# Patient Record
Sex: FEMALE | Race: WHITE | HISPANIC OR LATINO | Employment: UNEMPLOYED | URBAN - METROPOLITAN AREA
[De-identification: names, ages, dates, MRNs, and addresses within clinical notes are randomized per-mention and may not be internally consistent; named-entity substitution may affect disease eponyms.]

---
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
## 2023-01-13 RX ORDER — NORELGESTROMIN AND ETHINLY ESTRADIOL 150; 35 UG/D; UG/D
PATCH TRANSDERMAL
Qty: 9 PATCH | Refills: 0 | Status: SHIPPED | OUTPATIENT
Start: 2023-01-13 | End: 2023-04-05

## 2023-04-05 RX ORDER — NORELGESTROMIN AND ETHINLY ESTRADIOL 150; 35 UG/D; UG/D
PATCH TRANSDERMAL
Qty: 9 PATCH | Refills: 0 | Status: SHIPPED | OUTPATIENT
Start: 2023-04-05

## 2023-04-25 ENCOUNTER — TELEPHONE (OUTPATIENT)
Dept: SURGERY | Facility: CLINIC | Age: 18
End: 2023-04-25

## 2023-04-25 NOTE — TELEPHONE ENCOUNTER
HUB TO SHARE:    Received faxed request for Omeprazole capsules refill from [facility] in [place] for Dr. Hernandez.    Called patient and lvm informing her that Dr. Hernandez is no longer in practice. If she would like future refills she should give our office a call to schedule an appointment with one of our providers to establish care. Or she can ask her PCP if she might have one.     Declined Rx request and faxed back to [facility].

## 2023-05-12 ENCOUNTER — OFFICE VISIT (OUTPATIENT)
Dept: OBSTETRICS AND GYNECOLOGY | Facility: CLINIC | Age: 18
End: 2023-05-12
Payer: COMMERCIAL

## 2023-05-12 VITALS
SYSTOLIC BLOOD PRESSURE: 102 MMHG | DIASTOLIC BLOOD PRESSURE: 60 MMHG | BODY MASS INDEX: 25.4 KG/M2 | WEIGHT: 129.4 LBS | HEIGHT: 60 IN

## 2023-05-12 DIAGNOSIS — Z30.45 ENCOUNTER FOR SURVEILLANCE OF TRANSDERMAL PATCH HORMONAL CONTRACEPTIVE DEVICE: ICD-10-CM

## 2023-05-12 DIAGNOSIS — Z01.419 ENCOUNTER FOR GYNECOLOGICAL EXAMINATION WITHOUT ABNORMAL FINDING: ICD-10-CM

## 2023-05-12 DIAGNOSIS — Z13.89 SCREENING FOR GENITOURINARY CONDITION: Primary | ICD-10-CM

## 2023-05-12 DIAGNOSIS — Z11.3 SCREENING FOR STD (SEXUALLY TRANSMITTED DISEASE): ICD-10-CM

## 2023-05-12 LAB
BILIRUB BLD-MCNC: NEGATIVE MG/DL
CLARITY, POC: CLEAR
COLOR UR: YELLOW
GLUCOSE UR STRIP-MCNC: NEGATIVE MG/DL
KETONES UR QL: NEGATIVE
LEUKOCYTE EST, POC: NEGATIVE
NITRITE UR-MCNC: NEGATIVE MG/ML
PH UR: 5 [PH] (ref 5–8)
PROT UR STRIP-MCNC: NEGATIVE MG/DL
RBC # UR STRIP: NEGATIVE /UL
SP GR UR: 1 (ref 1–1.03)
UROBILINOGEN UR QL: NORMAL

## 2023-05-12 NOTE — PROGRESS NOTES
" GYN Exam    CC- Here for annual and f/u patches    Yuliya Mejias is a 17 y.o. female est pt here for annaul exam.  She is on the Xulane patch and is doing well.  She is having a light cycle every month and has no side effects. She is at [employer] and studying Khmer and English. . She is SA and using condoms. She never wants children and is wanting a tubal. We discussed a Kyleena IUD and she will consider it.       OB History        0    Para   0    Term   0       0    AB   0    Living   0       SAB   0    IAB   0    Ectopic   0    Molar   0    Multiple   0    Live Births   0          Obstetric Comments   No plans             Menarche: 11  Current contraception: condoms & patch  History of abnormal Pap smear: no  History of abnormal mammogram: no  Family history of uterine, colon or ovarian cancer: no  Family history of breast cancer: no  H/o STDs: none  Gardasil: 3/3  YORDAN; none  \"mild \" PCOS    Health Maintenance   Topic Date Due   • HEPATITIS C SCREENING  Never done   • ANNUAL PHYSICAL  Never done   • COVID-19 Vaccine (3 - Booster for Pfizer series) 10/03/2022   • INFLUENZA VACCINE  2023   • CHLAMYDIA SCREENING  2024   • TDAP/TD VACCINES (2 - Td or Tdap) 2025   • MENINGOCOCCAL VACCINE  Completed   • HPV VACCINES  Completed   • Pneumococcal Vaccine 0-64  Aged Out       Past Medical History:   Diagnosis Date   • Acute ear infection        Past Surgical History:   Procedure Laterality Date   • MYRINGOTOMY W/ TUBES     • TONSILLECTOMY           Current Outpatient Medications:   •  Zafemy 150-35 MCG/24HR, APPLY 1 PATCH EVERY WEEK FOR 3 WEEKS. WEEK 4 IS PATCH FREE, Disp: 9 patch, Rfl: 0    No Known Allergies    Social History     Tobacco Use   • Smoking status: Never     Passive exposure: Yes   • Smokeless tobacco: Never   Vaping Use   • Vaping Use: Never used   Substance Use Topics   • Alcohol use: No   • Drug use: No       Family History   Adopted: Yes   Problem Relation Age of Onset   • " "Diabetes Mother    • Diabetes Father    • Breast cancer Neg Hx    • Ovarian cancer Neg Hx    • Colon cancer Neg Hx    • Deep vein thrombosis Neg Hx    • Pulmonary embolism Neg Hx        Review of Systems   Constitutional: Positive for activity change. Negative for appetite change, fatigue, fever and unexpected weight change.   Eyes: Negative for photophobia and visual disturbance.   Respiratory: Negative for cough and shortness of breath.    Cardiovascular: Negative for chest pain and palpitations.   Gastrointestinal: Negative for abdominal distention, abdominal pain, constipation, diarrhea and nausea.   Endocrine: Negative for cold intolerance and heat intolerance.   Genitourinary: Negative for dyspareunia, dysuria, menstrual problem, pelvic pain, vaginal bleeding, vaginal discharge and vaginal pain.   Skin: Negative for color change and rash.        Hair growth and acne   Allergic/Immunologic: Positive for environmental allergies.   Neurological: Negative for headaches.   Hematological: Negative for adenopathy. Does not bruise/bleed easily.   Psychiatric/Behavioral: Negative for dysphoric mood. The patient is not nervous/anxious.    All other systems reviewed and are negative.      /60   Ht 152.4 cm (60\")   Wt 58.7 kg (129 lb 6.4 oz)   LMP 04/20/2023 (Approximate)   BMI 25.27 kg/m²     Physical Exam   Constitutional: She is oriented to person, place, and time. She appears well-developed.   HENT:   Head: Normocephalic and atraumatic.   Eyes: Conjunctivae are normal. No scleral icterus.   Neck: No thyromegaly present.   Cardiovascular: Normal rate, regular rhythm and normal heart sounds. Exam reveals no gallop and no friction rub.   No murmur heard.  Pulmonary/Chest: Effort normal and breath sounds normal. She has no wheezes. Right breast exhibits no inverted nipple, no mass, no nipple discharge, no skin change and no tenderness. Left breast exhibits no inverted nipple, no mass, no nipple discharge, no " skin change and no tenderness.   SBE taught   Abdominal: Soft. Normal appearance and bowel sounds are normal. She exhibits no distension and no mass. There is no abdominal tenderness. There is no rebound and no guarding. No hernia.   Genitourinary:    Vagina, cervix, right adnexa and left adnexa normal.   There is no rash, tenderness, lesion or injury on the right labia. There is no rash, tenderness, lesion or injury on the left labia.   Neurological: She is alert and oriented to person, place, and time.   Skin: Skin is warm and dry.   Psychiatric: Her behavior is normal. Mood, judgment and thought content normal.   Nursing note and vitals reviewed.         Assessment/Plan    1)  GYN HM: pap age 19   SBE demonstrated and encouraged.STD screening  2)  Check C/G/T, pt declines the full STD panel.   3) Contraception: Patches going well. No issues. Discussed with patient correct usage of oral contraceptive pills/patches/rings and what to do for a missed dose.  Patient reminded that condoms are the only form of contraceptive that can also prevent STDs and so use is encouraged with every act of coitus.  We reviewed ACHES warning signs (abdominal pain, chest pain, headache, eye vision changes or severe leg pain and or swelling).  Patient is encouraged to call for any questions or concerns.  ERX Xulane.   4) Family Planning: no plans, encourage folic acid daily  5) Diet and Exercise discussed  6) Smoking Status: nonsmoker  7) Social:  Doing well , no issues. In college   8) Discussed with patient daily folic acid intake to prevent birth defects such as spina bifida.  I also encouraged use of condoms, self breast exam and 30 minutes of daily exercise.  We discussed normal menstrual cycles and use of adequate contraception.    9) Parts of this document have been copied or forwarded from her previous visits and have been reviewed, updated and edited as indicated. .  10)RTO 1 year annual and/or prn.        Diagnoses and all  orders for this visit:    1. Screening for genitourinary condition (Primary)  -     Chlamydia trachomatis, Neisseria gonorrhoeae, Trichomonas vaginalis, PCR - Urine, Urine, Random Void  -     POC Urinalysis Dipstick    2. Encounter for surveillance of transdermal patch hormonal contraceptive device    3. Encounter for gynecological examination without abnormal finding    4. Screening for STD (sexually transmitted disease)          Linnea Mancini MD  5/12/2023  15:24 EDT

## 2023-05-15 LAB
C TRACH RRNA SPEC QL NAA+PROBE: NEGATIVE
N GONORRHOEA RRNA SPEC QL NAA+PROBE: NEGATIVE
T VAGINALIS RRNA SPEC QL NAA+PROBE: NEGATIVE

## 2024-05-13 ENCOUNTER — OFFICE VISIT (OUTPATIENT)
Dept: OBSTETRICS AND GYNECOLOGY | Facility: CLINIC | Age: 19
End: 2024-05-13
Payer: COMMERCIAL

## 2024-05-13 VITALS
BODY MASS INDEX: 24.35 KG/M2 | HEIGHT: 60 IN | WEIGHT: 124 LBS | SYSTOLIC BLOOD PRESSURE: 110 MMHG | DIASTOLIC BLOOD PRESSURE: 70 MMHG

## 2024-05-13 DIAGNOSIS — Z01.419 ROUTINE GYNECOLOGICAL EXAMINATION: Primary | ICD-10-CM

## 2024-05-13 DIAGNOSIS — Z30.45 ENCOUNTER FOR SURVEILLANCE OF TRANSDERMAL PATCH HORMONAL CONTRACEPTIVE DEVICE: ICD-10-CM

## 2024-05-13 DIAGNOSIS — Z11.3 SCREEN FOR STD (SEXUALLY TRANSMITTED DISEASE): ICD-10-CM

## 2024-05-13 LAB
B-HCG UR QL: NEGATIVE
BILIRUB BLD-MCNC: NEGATIVE MG/DL
CLARITY, POC: CLEAR
COLOR UR: YELLOW
EXPIRATION DATE: NORMAL
GLUCOSE UR STRIP-MCNC: NEGATIVE MG/DL
INTERNAL NEGATIVE CONTROL: NORMAL
INTERNAL POSITIVE CONTROL: NORMAL
KETONES UR QL: NEGATIVE
LEUKOCYTE EST, POC: NEGATIVE
Lab: NORMAL
NITRITE UR-MCNC: NEGATIVE MG/ML
PH UR: 5 [PH] (ref 5–8)
PROT UR STRIP-MCNC: NEGATIVE MG/DL
RBC # UR STRIP: NEGATIVE /UL
SP GR UR: 1 (ref 1–1.03)
UROBILINOGEN UR QL: NORMAL

## 2024-05-13 PROCEDURE — 2014F MENTAL STATUS ASSESS: CPT | Performed by: OBSTETRICS & GYNECOLOGY

## 2024-05-13 PROCEDURE — 99395 PREV VISIT EST AGE 18-39: CPT | Performed by: OBSTETRICS & GYNECOLOGY

## 2024-05-13 PROCEDURE — 1160F RVW MEDS BY RX/DR IN RCRD: CPT | Performed by: OBSTETRICS & GYNECOLOGY

## 2024-05-13 PROCEDURE — 1159F MED LIST DOCD IN RCRD: CPT | Performed by: OBSTETRICS & GYNECOLOGY

## 2024-05-13 PROCEDURE — 81002 URINALYSIS NONAUTO W/O SCOPE: CPT | Performed by: OBSTETRICS & GYNECOLOGY

## 2024-05-13 PROCEDURE — 81025 URINE PREGNANCY TEST: CPT | Performed by: OBSTETRICS & GYNECOLOGY

## 2024-05-13 RX ORDER — NORELGESTROMIN AND ETHINYL ESTRADIOL 35; 150 UG/MG; UG/MG
1 PATCH TRANSDERMAL SEE ADMIN INSTRUCTIONS
Qty: 9 PATCH | Refills: 3 | Status: SHIPPED | OUTPATIENT
Start: 2024-05-13

## 2024-05-13 NOTE — PROGRESS NOTES
" GYN Exam    CC- Here for annual and f/u patches    Yuliya Mejias is a 18 y.o. female est pt here for annaul exam.  She is on the Xulane patch and is doing well.  She is having a light cycle every month and has no side effects. She is at [employer] and studying Latvian and English. She has 2 more years. She is SA and using condoms. She  is going to [place] this summer.       OB History          0    Para   0    Term   0       0    AB   0    Living   0         SAB   0    IAB   0    Ectopic   0    Molar   0    Multiple   0    Live Births   0          Obstetric Comments   No plans               Menarche: 11  Current contraception: condoms & patch  History of abnormal Pap smear: no  History of abnormal mammogram: no  Family history of uterine, colon or ovarian cancer: no  Family history of breast cancer: no  H/o STDs: none  Gardasil: 3/3  YORDAN; none  \"mild \" PCOS    Health Maintenance   Topic Date Due    HEPATITIS C SCREENING  Never done    ANNUAL PHYSICAL  Never done    COVID-19 Vaccine (3 - 2023- season) 2023    CHLAMYDIA SCREENING  2024    INFLUENZA VACCINE  2024    TDAP/TD VACCINES (2 - Td or Tdap) 2025    MENINGOCOCCAL VACCINE  Completed    HPV VACCINES  Completed    Pneumococcal Vaccine 0-64  Aged Out       Past Medical History:   Diagnosis Date    Acute ear infection        Past Surgical History:   Procedure Laterality Date    MYRINGOTOMY W/ TUBES      TONSILLECTOMY           Current Outpatient Medications:     norelgestromin-ethinyl estradiol (Zafemy) 150-35 MCG/24HR, Place 1 patch on the skin as directed by provider See Admin Instructions., Disp: 9 patch, Rfl: 3    No Known Allergies    Social History     Tobacco Use    Smoking status: Never     Passive exposure: Yes    Smokeless tobacco: Never   Vaping Use    Vaping status: Never Used   Substance Use Topics    Alcohol use: No    Drug use: No       Family History   Adopted: Yes   Problem Relation Age of Onset    Diabetes Mother  " "   Diabetes Father     Breast cancer Neg Hx     Ovarian cancer Neg Hx     Colon cancer Neg Hx     Deep vein thrombosis Neg Hx     Pulmonary embolism Neg Hx        Review of Systems   Constitutional:  Negative for activity change, appetite change, fatigue, fever and unexpected weight change.   Eyes:  Negative for photophobia and visual disturbance.   Respiratory:  Negative for cough and shortness of breath.    Cardiovascular:  Negative for chest pain and palpitations.   Gastrointestinal:  Negative for abdominal distention, abdominal pain, constipation, diarrhea and nausea.   Endocrine: Negative for cold intolerance and heat intolerance.   Genitourinary:  Negative for dyspareunia, dysuria, menstrual problem, pelvic pain, vaginal bleeding, vaginal discharge and vaginal pain.   Skin:  Negative for color change and rash.        Hair growth and acne   Allergic/Immunologic: Negative for environmental allergies.   Neurological:  Negative for headaches.   Hematological:  Negative for adenopathy. Does not bruise/bleed easily.   Psychiatric/Behavioral:  Negative for dysphoric mood. The patient is not nervous/anxious.    All other systems reviewed and are negative.      /70   Ht 152.4 cm (60\")   Wt 56.2 kg (124 lb)   LMP 04/17/2024   Breastfeeding No   BMI 24.22 kg/m²     Physical Exam   Constitutional: She is oriented to person, place, and time. She appears well-developed.   HENT:   Head: Normocephalic and atraumatic.   Eyes: Conjunctivae are normal. No scleral icterus.   Neck: No thyromegaly present.   Cardiovascular: Normal rate, regular rhythm and normal heart sounds. Exam reveals no gallop and no friction rub.   No murmur heard.  Pulmonary/Chest: Effort normal and breath sounds normal. She has no wheezes. Right breast exhibits no inverted nipple, no mass, no nipple discharge, no skin change and no tenderness. Left breast exhibits no inverted nipple, no mass, no nipple discharge, no skin change and no tenderness. "   SBE taught   Abdominal: Soft. Normal appearance and bowel sounds are normal. She exhibits no distension and no mass. There is no abdominal tenderness. There is no rebound and no guarding. No hernia.   Genitourinary:    Vagina, cervix, right adnexa and left adnexa normal.   There is no rash, tenderness, lesion or injury on the right labia. There is no rash, tenderness, lesion or injury on the left labia.   Neurological: She is alert and oriented to person, place, and time.   Skin: Skin is warm and dry.   Psychiatric: Her behavior is normal. Mood, judgment and thought content normal.   Nursing note and vitals reviewed.         Assessment/Plan    1)  GYN HM: pap age 19   SBE demonstrated and encouraged.STD screening  2)  Check STD panel.   3) Contraception: Patches going well. No issues. Discussed with patient correct usage of oral contraceptive pills/patches/rings and what to do for a missed dose.  Patient reminded that condoms are the only form of contraceptive that can also prevent STDs and so use is encouraged with every act of coitus.  We reviewed ACHES warning signs (abdominal pain, chest pain, headache, eye vision changes or severe leg pain and or swelling).  Patient is encouraged to call for any questions or concerns.  ERX Xulane.   4) Family Planning: no plans, encourage folic acid daily  5) Diet and Exercise discussed  6) Smoking Status: nonsmoker  7) Social:  Doing well , no issues. In college   8) Discussed with patient daily folic acid intake to prevent birth defects such as spina bifida.  I also encouraged use of condoms, self breast exam and 30 minutes of daily exercise.  We discussed normal menstrual cycles and use of adequate contraception.    9) Parts of this document have been copied or forwarded from her previous visits and have been reviewed, updated and edited as indicated. .  10)RTO 1 year annual and/or prn.        Diagnoses and all orders for this visit:    1. Routine gynecological examination  (Primary)  -     POC Urinalysis Dipstick  -     POC Pregnancy, Urine  -     Chlamydia trachomatis, Neisseria gonorrhoeae, Trichomonas vaginalis, PCR - Urine, Urine, Random Void  -     Hepatitis B Surface Antigen  -     Hepatitis C Antibody  -     HIV-1 / O / 2 Ag / Antibody  -     HSV 1 & 2 - Specific Antibody, IgG  -     RPR, Rfx Qn RPR / Confirm TP    2. Screen for STD (sexually transmitted disease)    3. Encounter for surveillance of transdermal patch hormonal contraceptive device    Other orders  -     norelgestromin-ethinyl estradiol (Zafemy) 150-35 MCG/24HR; Place 1 patch on the skin as directed by provider See Admin Instructions.  Dispense: 9 patch; Refill: 3          Linnea Mancini MD  5/12/2023  14:55 EDT          Physical Exam  Vitals and nursing note reviewed. Exam conducted with a chaperone present.   Constitutional:       Appearance: Normal appearance. She is well-developed and normal weight.   HENT:      Head: Normocephalic and atraumatic.   Eyes:      General: No scleral icterus.     Conjunctiva/sclera: Conjunctivae normal.   Neck:      Thyroid: No thyromegaly.   Cardiovascular:      Rate and Rhythm: Normal rate and regular rhythm.      Heart sounds: Normal heart sounds. No murmur heard.     No friction rub. No gallop.   Pulmonary:      Effort: Pulmonary effort is normal.      Breath sounds: Normal breath sounds. No wheezing.      Comments: SBE taught  Chest:   Breasts:     Right: No swelling, bleeding, inverted nipple, mass, nipple discharge, skin change or tenderness.      Left: No swelling, bleeding, inverted nipple, mass, nipple discharge, skin change or tenderness.   Abdominal:      General: Bowel sounds are normal. There is no distension.      Palpations: Abdomen is soft. There is no mass.      Tenderness: There is no abdominal tenderness. There is no guarding or rebound.      Hernia: No hernia is present.   Genitourinary:     Labia:         Right: No rash, tenderness, lesion or  injury.         Left: No rash, tenderness, lesion or injury.       Urethra: No prolapse, urethral pain, urethral swelling or urethral lesion.      Vagina: Normal.      Cervix: Normal.      Uterus: Normal.       Adnexa: Right adnexa normal and left adnexa normal.   Musculoskeletal:      Cervical back: Neck supple.   Skin:     General: Skin is warm and dry.   Neurological:      Mental Status: She is alert and oriented to person, place, and time.   Psychiatric:         Mood and Affect: Mood normal.         Behavior: Behavior normal.         Thought Content: Thought content normal.         Judgment: Judgment normal.

## 2024-05-14 ENCOUNTER — LAB (OUTPATIENT)
Dept: OBSTETRICS AND GYNECOLOGY | Facility: CLINIC | Age: 19
End: 2024-05-14
Payer: COMMERCIAL

## 2024-05-14 DIAGNOSIS — Z11.3 SCREEN FOR STD (SEXUALLY TRANSMITTED DISEASE): Primary | ICD-10-CM

## 2024-05-15 LAB
C TRACH RRNA SPEC QL NAA+PROBE: NORMAL
HBV SURFACE AG SERPL QL IA: NEGATIVE
HCV IGG SERPL QL IA: NON REACTIVE
HIV 1+2 AB+HIV1 P24 AG SERPL QL IA: NON REACTIVE
HSV1 IGG SER IA-ACNC: 41.1 INDEX (ref 0–0.9)
HSV2 IGG SER IA-ACNC: <0.91 INDEX (ref 0–0.9)
N GONORRHOEA RRNA SPEC QL NAA+PROBE: NORMAL
RPR SER QL: NON REACTIVE
T VAGINALIS RRNA SPEC QL NAA+PROBE: NORMAL

## 2024-05-16 NOTE — PROGRESS NOTES
"I don't know how urine could be too \"viscous\", but can we have her stop by and leave another urine sample to test? Thanks AKINDER"

## 2024-10-18 RX ORDER — NORELGESTROMIN AND ETHINYL ESTRADIOL 35; 150 UG/D; UG/D
1 PATCH TRANSDERMAL SEE ADMIN INSTRUCTIONS
Qty: 9 PATCH | Refills: 0 | Status: SHIPPED | OUTPATIENT
Start: 2024-10-18

## 2025-05-18 RX ORDER — NORELGESTROMIN AND ETHINYL ESTRADIOL 35; 150 UG/D; UG/D
1 PATCH TRANSDERMAL SEE ADMIN INSTRUCTIONS
Qty: 9 PATCH | Refills: 0 | Status: SHIPPED | OUTPATIENT
Start: 2025-05-18 | End: 2025-05-19 | Stop reason: SDUPTHER

## 2025-05-19 ENCOUNTER — OFFICE VISIT (OUTPATIENT)
Dept: OBSTETRICS AND GYNECOLOGY | Facility: CLINIC | Age: 20
End: 2025-05-19
Payer: COMMERCIAL

## 2025-05-19 VITALS
SYSTOLIC BLOOD PRESSURE: 108 MMHG | DIASTOLIC BLOOD PRESSURE: 74 MMHG | WEIGHT: 136.13 LBS | BODY MASS INDEX: 26.73 KG/M2 | HEIGHT: 60 IN

## 2025-05-19 DIAGNOSIS — Z01.419 CERVICAL SMEAR, AS PART OF ROUTINE GYNECOLOGICAL EXAMINATION: ICD-10-CM

## 2025-05-19 DIAGNOSIS — Z01.419 ROUTINE GYNECOLOGICAL EXAMINATION: ICD-10-CM

## 2025-05-19 DIAGNOSIS — Z30.45 ENCOUNTER FOR SURVEILLANCE OF TRANSDERMAL PATCH HORMONAL CONTRACEPTIVE DEVICE: Primary | ICD-10-CM

## 2025-05-19 DIAGNOSIS — Z11.8 SCREENING FOR CHLAMYDIAL DISEASE: ICD-10-CM

## 2025-05-19 RX ORDER — GUAIFENESIN 200 MG/10ML
200 LIQUID ORAL
COMMUNITY
Start: 2025-04-14

## 2025-05-19 RX ORDER — NORELGESTROMIN AND ETHINYL ESTRADIOL 35; 150 UG/MG; UG/MG
1 PATCH TRANSDERMAL WEEKLY
Qty: 12 PATCH | Refills: 3 | Status: SHIPPED | OUTPATIENT
Start: 2025-05-19

## 2025-05-19 RX ORDER — ARIPIPRAZOLE 5 MG/1
TABLET ORAL
COMMUNITY
Start: 2024-09-25

## 2025-05-19 RX ORDER — BUSPIRONE HYDROCHLORIDE 7.5 MG/1
TABLET ORAL
COMMUNITY
Start: 2024-11-12

## 2025-05-19 RX ORDER — FLUTICASONE PROPIONATE 50 MCG
1 SPRAY, SUSPENSION (ML) NASAL DAILY
COMMUNITY
Start: 2024-11-12

## 2025-05-19 RX ORDER — IBUPROFEN 200 MG
200 TABLET ORAL EVERY 6 HOURS PRN
COMMUNITY
Start: 2024-11-12 | End: 2026-04-14

## 2025-05-19 RX ORDER — BUSPIRONE HYDROCHLORIDE 10 MG/1
TABLET ORAL
COMMUNITY
Start: 2025-04-01

## 2025-05-19 RX ORDER — DICYCLOMINE HYDROCHLORIDE 10 MG/1
10 CAPSULE ORAL
COMMUNITY
Start: 2024-12-11

## 2025-05-19 NOTE — PROGRESS NOTES
" GYN Exam    CC- Here for annual and f/u patches    Yuliya Mejias is a 19 y.o. female est pt here for annaul exam.  She is on the Xulane patch and is doing well.  She is having a light cycle every month and has no side effects. She is at [employer] and studying Swedish and English and is a rising senior.. She is SA and using condoms.  She is going to work in [place] this summer to be with her boyfriend.      OB History          0    Para   0    Term   0       0    AB   0    Living   0         SAB   0    IAB   0    Ectopic   0    Molar   0    Multiple   0    Live Births   0          Obstetric Comments   No plans               Menarche: 11  Current contraception: condoms & patch  History of abnormal Pap smear: no  History of abnormal mammogram: no  Family history of uterine, colon or ovarian cancer: no  Family history of breast cancer: no  H/o STDs: none  Gardasil: 3/3  YORDAN; none  \"mild \" PCOS    Health Maintenance   Topic Date Due    ANNUAL PHYSICAL  Never done    MENINGOCOCCAL B VACCINE (1 of 2 - Standard) Never done    CHLAMYDIA SCREENING  2024    PAP SMEAR  Never done    COVID-19 Vaccine (3 - 2024- season) 2024    Annual Gynecologic Pelvic and Breast Exam  2025    TDAP/TD VACCINES (2 - Td or Tdap) 2025    INFLUENZA VACCINE  2025    HEPATITIS C SCREENING  Completed    MENINGOCOCCAL VACCINE  Completed    HPV VACCINES  Completed    Pneumococcal Vaccine 0-49  Aged Out       Past Medical History:   Diagnosis Date    Acute ear infection     PMS (premenstrual syndrome)     Urinary tract infection 4/5       Past Surgical History:   Procedure Laterality Date    MYRINGOTOMY W/ TUBES      TONSILLECTOMY           Current Outpatient Medications:     ARIPiprazole (ABILIFY) 5 MG tablet, , Disp: , Rfl:     busPIRone (BUSPAR) 10 MG tablet, , Disp: , Rfl:     FLUoxetine (PROzac) 20 MG capsule, , Disp: , Rfl:     ibuprofen (ADVIL,MOTRIN) 200 MG tablet, Take 1 tablet by mouth Every 6 (Six) Hours " As Needed., Disp: , Rfl:     norelgestromin-ethinyl estradiol (Zafemy) 150-35 MCG/24HR, Place 1 patch on the skin as directed by provider 1 (One) Time Per Week. Apply 1 patch every week for 3 weeks and then week 4 is patch free as directed, Disp: 12 patch, Rfl: 3    busPIRone (BUSPAR) 7.5 MG tablet, , Disp: , Rfl:     dicyclomine (BENTYL) 10 MG capsule, Take 1 capsule by mouth. (Patient not taking: Reported on 5/19/2025), Disp: , Rfl:     fluticasone (FLONASE) 50 MCG/ACT nasal spray, Administer 1 spray into the nostril(s) as directed by provider Daily. (Patient not taking: Reported on 5/19/2025), Disp: , Rfl:     guaifenesin (ROBITUSSIN) 100 MG/5ML liquid, Take 10 mL by mouth. (Patient not taking: Reported on 5/19/2025), Disp: , Rfl:     No Known Allergies    Social History     Tobacco Use    Smoking status: Never     Passive exposure: Yes    Smokeless tobacco: Never   Vaping Use    Vaping status: Never Used   Substance Use Topics    Alcohol use: No    Drug use: No       Family History   Adopted: Yes   Problem Relation Age of Onset    Diabetes Mother         Don't know real family so don't know much about family history    Diabetes Father     Breast cancer Neg Hx     Ovarian cancer Neg Hx     Colon cancer Neg Hx     Deep vein thrombosis Neg Hx     Pulmonary embolism Neg Hx        Review of Systems   Constitutional:  Negative for activity change, appetite change, fatigue, fever and unexpected weight change.   Eyes:  Negative for photophobia and visual disturbance.   Respiratory:  Negative for cough and shortness of breath.    Cardiovascular:  Negative for chest pain and palpitations.   Gastrointestinal:  Negative for abdominal distention, abdominal pain, constipation, diarrhea and nausea.   Endocrine: Negative for cold intolerance and heat intolerance.   Genitourinary:  Negative for dyspareunia, dysuria, menstrual problem, pelvic pain, vaginal bleeding, vaginal discharge and vaginal pain.   Skin:  Negative for color  "change and rash.        Hair growth and acne   Allergic/Immunologic: Negative for environmental allergies.   Neurological:  Negative for headaches.   Hematological:  Negative for adenopathy. Does not bruise/bleed easily.   Psychiatric/Behavioral:  Negative for dysphoric mood. The patient is not nervous/anxious.    All other systems reviewed and are negative.      /74   Ht 152.4 cm (60\")   Wt 61.7 kg (136 lb 2 oz)   LMP 05/12/2025 (Exact Date)   Breastfeeding No   BMI 26.59 kg/m²     Physical Exam   Constitutional: She is oriented to person, place, and time. She appears well-developed.   HENT:   Head: Normocephalic and atraumatic.   Eyes: Conjunctivae are normal. No scleral icterus.   Neck: No thyromegaly present.   Cardiovascular: Normal rate, regular rhythm and normal heart sounds. Exam reveals no gallop and no friction rub.   No murmur heard.  Pulmonary/Chest: Effort normal and breath sounds normal. She has no wheezes. Right breast exhibits no inverted nipple, no mass, no nipple discharge, no skin change and no tenderness. Left breast exhibits no inverted nipple, no mass, no nipple discharge, no skin change and no tenderness.   SBE taught   Abdominal: Soft. Normal appearance and bowel sounds are normal. She exhibits no distension and no mass. There is no abdominal tenderness. There is no rebound and no guarding. No hernia.   Genitourinary:    Vagina, cervix, right adnexa and left adnexa normal.      Pelvic exam was performed with patient supine.   There is no rash, tenderness, lesion or injury on the right labia. There is no rash, tenderness, lesion or injury on the left labia. Uterus is not deviated, not enlarged, not fixed and not tender. Cervix exhibits no motion tenderness, no discharge and no friability. Right adnexum displays no mass, no tenderness and no fullness. Left adnexum displays no mass, no tenderness and no fullness.    No vaginal discharge, erythema, tenderness or bleeding.   No " erythema, tenderness or bleeding in the vagina.    No foreign body in the vagina.      No signs of injury in the vagina.     Neurological: She is alert and oriented to person, place, and time.   Skin: Skin is warm and dry.   Psychiatric: Her behavior is normal. Mood, judgment and thought content normal.   Nursing note and vitals reviewed.         Assessment/Plan    1)  GYN HM: Check Pap/G/C/T   SBE demonstrated and encouraged.STD screening  2) Declines full STD panel.   3) Contraception: Patches going well. No issues. Discussed with patient correct usage of oral contraceptive pills/patches/rings and what to do for a missed dose.  Patient reminded that condoms are the only form of contraceptive that can also prevent STDs and so use is encouraged with every act of coitus.  We reviewed ACHES warning signs (abdominal pain, chest pain, headache, eye vision changes or severe leg pain and or swelling).  Patient is encouraged to call for any questions or concerns.  ERX Xulane.   4) Family Planning: no plans, encourage folic acid daily  5) Diet and Exercise discussed  6) Smoking Status: nonsmoker  7) Social:  Doing well , no issues. In college   8) Discussed with patient daily folic acid intake to prevent birth defects such as spina bifida.  I also encouraged use of condoms, self breast exam and 30 minutes of daily exercise.  We discussed normal menstrual cycles and use of adequate contraception.    9) Parts of this document have been copied or forwarded from her previous visits and have been reviewed, updated and edited as indicated. .  10)RTO 1 year annual and/or prn.        Diagnoses and all orders for this visit:    1. Cervical smear, as part of routine gynecological examination (Primary)  -     IGP,CtNgTv,rfx Aptima HPV ASCU    2. Routine gynecological examination  -     POC Urinalysis Dipstick  -     POC Pregnancy, Urine  -     Cancel: Chlamydia trachomatis, Neisseria gonorrhoeae, Trichomonas vaginalis, PCR - Urine,  Urine, Random Void  -     IGP,CtNgTv,rfx Aptima HPV ASCU    3. Encounter for surveillance of transdermal patch hormonal contraceptive device    4. Screening for chlamydial disease    Other orders  -     norelgestromin-ethinyl estradiol (Zafemy) 150-35 MCG/24HR; Place 1 patch on the skin as directed by provider 1 (One) Time Per Week. Apply 1 patch every week for 3 weeks and then week 4 is patch free as directed  Dispense: 12 patch; Refill: 3          Linnea Mancini MD  5/19/2025  14:18 EDT

## 2025-05-21 LAB
C TRACH RRNA CVX QL NAA+PROBE: NEGATIVE
CONV .: NORMAL
CYTOLOGIST CVX/VAG CYTO: NORMAL
CYTOLOGY CVX/VAG DOC CYTO: NORMAL
CYTOLOGY CVX/VAG DOC THIN PREP: NORMAL
DX ICD CODE: NORMAL
N GONORRHOEA RRNA CVX QL NAA+PROBE: NEGATIVE
OTHER STN SPEC: NORMAL
SERVICE CMNT-IMP: NORMAL
STAT OF ADQ CVX/VAG CYTO-IMP: NORMAL
T VAGINALIS RRNA SPEC QL NAA+PROBE: NEGATIVE